# Patient Record
Sex: FEMALE | Race: WHITE
[De-identification: names, ages, dates, MRNs, and addresses within clinical notes are randomized per-mention and may not be internally consistent; named-entity substitution may affect disease eponyms.]

---

## 2017-09-12 ENCOUNTER — HOSPITAL ENCOUNTER (EMERGENCY)
Dept: HOSPITAL 17 - NEPD | Age: 27
Discharge: HOME | End: 2017-09-12
Payer: COMMERCIAL

## 2017-09-12 VITALS
RESPIRATION RATE: 16 BRPM | DIASTOLIC BLOOD PRESSURE: 99 MMHG | SYSTOLIC BLOOD PRESSURE: 155 MMHG | OXYGEN SATURATION: 99 % | TEMPERATURE: 98.1 F | HEART RATE: 84 BPM

## 2017-09-12 VITALS — BODY MASS INDEX: 22.84 KG/M2 | HEIGHT: 67 IN | WEIGHT: 145.51 LBS

## 2017-09-12 VITALS — SYSTOLIC BLOOD PRESSURE: 121 MMHG | DIASTOLIC BLOOD PRESSURE: 67 MMHG

## 2017-09-12 DIAGNOSIS — K30: ICD-10-CM

## 2017-09-12 DIAGNOSIS — J45.909: ICD-10-CM

## 2017-09-12 DIAGNOSIS — R53.81: ICD-10-CM

## 2017-09-12 DIAGNOSIS — Z34.91: ICD-10-CM

## 2017-09-12 DIAGNOSIS — J02.9: Primary | ICD-10-CM

## 2017-09-12 DIAGNOSIS — R11.0: ICD-10-CM

## 2017-09-12 DIAGNOSIS — Z3A.01: ICD-10-CM

## 2017-09-12 DIAGNOSIS — R05: ICD-10-CM

## 2017-09-12 DIAGNOSIS — R09.81: ICD-10-CM

## 2017-09-12 PROCEDURE — 99283 EMERGENCY DEPT VISIT LOW MDM: CPT

## 2017-09-12 NOTE — PD
HPI


Chief Complaint:  Cold / Flu Symptoms


Time Seen by Provider:  04:26


Travel History


International Travel<30 days:  No


Contact w/Intl Traveler<30days:  No


Traveled to known affect area:  No





History of Present Illness


HPI


27-year-old white female who states that she is approximately 6 weeks pregnant 

presents to emergency department with a three-day history of sore throat.  She 

states that in addition to her sore throat today she developed a low-grade 

temperature 100.  She admits to feeling general malaise, nausea and upset 

stomach, cough, nasal congestion.  She states that she assumes a lot of her 

symptoms are associated with early pregnancy.    She denies vomiting.  No 

abdominal pain.  No leakage of fluid or abnormal bleeding.  No urinary 

symptoms.  No shortness of breath or wheezing.





PFSH


Past Medical History


Asthma:  Yes


Cardiovascular Problems:  Yes (MURMUR, LOOP RECORDER IN PLACE)


Diminished Hearing:  No


Genitourinary:  Yes (FREQ UTI's)


Immunizations Current:  Yes


Pregnant?:  Pregnant


:  3


Para:  1


Miscarriage:  1





Past Surgical History


 Section:  Yes





Social History


Alcohol Use:  No


Tobacco Use:  No


Substance Use:  No





Allergies-Medications


(Allergen,Severity, Reaction):  


Coded Allergies:  


     metronidazole (Verified  Allergy, Severe, 17)


Reported Meds & Prescriptions





Reported Meds & Active Scripts


Active


Reported


Albuterol Neb (Albuterol Sulfate) 2.5 Mg/0.5 Ml Neb 2.5 Mg NEB Q4HR NEB PRN


     Note: The Albuterol Sulfate Inhalation Solution is concentrated and


     must be diluted. Read complete instructions carefully before using.


Proventil Hfa 6.7 GM Inh (Albuterol Sulfate) 90 Mcg/Act Aer 2 Puff INH Q6H PRN








Review of Systems


Except as stated in HPI:  all other systems reviewed are Neg





Physical Exam


Narrative


GENERAL:  Well-developed, well-nourished in no acute distress. Nontoxic 

appearing.


HEAD: Normocephalic, atraumatic.


EYES: Pupils equal round and reactive. Extraocular motions intact. No scleral 

icterus. No injection or drainage. 


ENT: TMs clear without erythema.  The external auditory canals clear.  Nose: 

clear .  Posterior pharynx is pink and moist.  No tonsillar edema or exudate.  

Uvula midline. Airway patent.


NECK: Trachea midline.Supple, nontender, moves head freely.  No central bony 

tenderness or spasm.


CARDIOVASCULAR: Regular rate and rhythm without murmurs, gallops, or rubs. 


RESPIRATORY: Clear to auscultation. Breath sounds equal bilaterally. No wheezes

, rales, or rhonchi.  


GASTROINTESTINAL: Abdomen soft, non-tender, nondistended. No hepato-splenomegaly

, or palpable masses. No guarding.


EXTREMITIES: No clubbing, cyanosis, or edema. No joint tenderness, effusion, or 

edema noted. 


BACK: Nontender without deformity or crepitance. No flank tenderness.





Data


Data


Last Documented VS





Vital Signs








  Date Time  Temp Pulse Resp B/P (MAP) Pulse Ox O2 Delivery O2 Flow Rate FiO2


 


17 04:24    121/67 (85)    


 


17 04:10 98.1 84 16  99 Room Air  








Orders





 Orders


Penicillin V Potassium (Veetids) (17 04:45)


Acetaminophen (Tylenol) (17 04:45)








MDM


Medical Decision Making


Medical Screen Exam Complete:  Yes


Emergency Medical Condition:  Yes


Medical Record Reviewed:  Yes


Differential Diagnosis


MDM: High


Differential diagnoses: Strep throat, viral pharyngitis, mono


Narrative Course


Patient is given Pen-Vee K 500 and Tylenol 500 mg by mouth.





This acute pharyngitis





Diagnosis





 Primary Impression:  


 Acute pharyngitis


 Qualified Codes:  J02.8 - Acute pharyngitis due to other specified organisms


Patient Instructions:  General Instructions





***Additional Instructions:  


Rest.


Force fluids.


Saltwater gargles.


Tylenol.


Pen-Vee K


Follow-up with a primary care doctor in one week.


Return to the ER if any problems.


***Med/Other Pt SpecificInfo:  Prescription(s) given


Scripts


Penicillin V Potassium (Penicillin V Potassium) 500 Mg Tab


500 MG PO Q12HR for Infection, #20 TAB 0 Refills


   Prov: Lani Campos MD         17


Disposition:  01 DISCHARGE HOME


Condition:  Stable











Maxwell Mcleod Sep 12, 2017 04:39

## 2018-03-02 ENCOUNTER — HOSPITAL ENCOUNTER (EMERGENCY)
Dept: HOSPITAL 17 - HOBED | Age: 28
Discharge: HOME | End: 2018-03-02
Payer: MEDICAID

## 2018-03-02 VITALS — HEART RATE: 72 BPM | SYSTOLIC BLOOD PRESSURE: 127 MMHG | DIASTOLIC BLOOD PRESSURE: 63 MMHG

## 2018-03-02 DIAGNOSIS — O47.03: ICD-10-CM

## 2018-03-02 DIAGNOSIS — Z88.8: ICD-10-CM

## 2018-03-02 DIAGNOSIS — R51: ICD-10-CM

## 2018-03-02 DIAGNOSIS — Z3A.30: ICD-10-CM

## 2018-03-02 DIAGNOSIS — O26.893: Primary | ICD-10-CM

## 2018-03-02 LAB
ALBUMIN SERPL-MCNC: 3.3 GM/DL (ref 3.4–5)
ALP SERPL-CCNC: 141 U/L (ref 45–117)
ALT SERPL-CCNC: 25 U/L (ref 10–53)
AST SERPL-CCNC: 20 U/L (ref 15–37)
BILIRUB SERPL-MCNC: 0.2 MG/DL (ref 0.2–1)
BUN SERPL-MCNC: 5 MG/DL (ref 7–18)
CALCIUM SERPL-MCNC: 9.3 MG/DL (ref 8.5–10.1)
CHLORIDE SERPL-SCNC: 103 MEQ/L (ref 98–107)
CREAT SERPL-MCNC: 0.46 MG/DL (ref 0.5–1)
ERYTHROCYTE [DISTWIDTH] IN BLOOD BY AUTOMATED COUNT: 13.7 % (ref 11.6–17.2)
GFR SERPLBLD BASED ON 1.73 SQ M-ARVRAT: 164 ML/MIN (ref 89–?)
GLUCOSE SERPL-MCNC: 68 MG/DL (ref 74–106)
HCO3 BLD-SCNC: 22.4 MEQ/L (ref 21–32)
HCT VFR BLD CALC: 35.4 % (ref 35–46)
HGB BLD-MCNC: 12.2 GM/DL (ref 11.6–15.3)
MCH RBC QN AUTO: 30.9 PG (ref 27–34)
MCHC RBC AUTO-ENTMCNC: 34.5 % (ref 32–36)
MCV RBC AUTO: 89.5 FL (ref 80–100)
PLATELET # BLD: 228 TH/MM3 (ref 150–450)
PMV BLD AUTO: 9.7 FL (ref 7–11)
PROT SERPL-MCNC: 7.8 GM/DL (ref 6.4–8.2)
RBC # BLD AUTO: 3.95 MIL/MM3 (ref 4–5.3)
SODIUM SERPL-SCNC: 137 MEQ/L (ref 136–145)
WBC # BLD AUTO: 13.4 TH/MM3 (ref 4–11)

## 2018-03-02 PROCEDURE — 96375 TX/PRO/DX INJ NEW DRUG ADDON: CPT

## 2018-03-02 PROCEDURE — 96374 THER/PROPH/DIAG INJ IV PUSH: CPT

## 2018-03-02 PROCEDURE — 84550 ASSAY OF BLOOD/URIC ACID: CPT

## 2018-03-02 PROCEDURE — 96372 THER/PROPH/DIAG INJ SC/IM: CPT

## 2018-03-02 PROCEDURE — 80053 COMPREHEN METABOLIC PANEL: CPT

## 2018-03-02 PROCEDURE — 99284 EMERGENCY DEPT VISIT MOD MDM: CPT

## 2018-03-02 PROCEDURE — 85027 COMPLETE CBC AUTOMATED: CPT

## 2018-03-02 NOTE — PD
HPI


Chief Complaint


Headache, epigastric pain, heartburn


Date Seen:  Mar 2, 2018


Time Seen:  14:00


Travel History


International Travel<30 Days:  No


Contact w/Intl Traveler<30Days:  No


Known Affected Area:  No





History of Present Illness


HPI


This patient is a 27-year-old white female  at 30 weeks gestation sees Dr. Romeo prenatal care previous  for severe preeclampsia at 29 weeks  

in the last pregnancy she presents claiming of a persistent frontal headache, 

some epigastric pain and heartburn relieved with over-the-counter Zantac.  

Denies right upper quadrant pain swelling, visual changes.  Baby is active 

fetal heart rate tracing is reactive and there are relatively regular 

contractions noted on the monitor if the patient is really not noticing except 

that she says her stomach gets tight.


Weeks Gestation:  30


Para:  1


:  3


Last Menstrual Period:  Mar 2, 2018


Miscarriage:  1





History


Obstetric History


Obstetric History


At 29 weeks patient last pregnancy is emergency  for severe 

preeclampsia





Past Surgical History


*** Narrative Surgical





Surgical History:  No Previous Surgery





Social History


Alcohol Use:  No


Tobacco Use:  No


Substance Abuse:  No





Allergies-Medications


(Allergen,Severity, Reaction):  


Coded Allergies:  


     metronidazole (Verified  Allergy, Severe, 17)


Home Meds


Active Scripts


Penicillin V Potassium (Penicillin V Potassium) 500 Mg Tab, 500 MG PO Q12HR for 

Infection, #20 TAB 0 Refills


   Prov:Lani Campos MD         17


Reported Medications


Albuterol Neb (Albuterol Neb) 2.5 Mg/0.5 Ml Neb, 2.5 MG NEB Q4HR NEB Y for 

SHORTNESS OF BREATH, EA


   Note: The Albuterol Sulfate Inhalation Solution is concentrated and


   must be diluted. Read complete instructions carefully before using.


   17


Albuterol 6.7 GM Inh (Proventil Hfa 6.7 GM Inh) 90 Mcg/Act Aer, 2 PUFF INH Q6H 

Y for SHORTNESS OF BREATH, #1 INHALER 0 Refills


   17





Review of Systems


General / Constitutional:  No: Fever, Weight Gain, Chills, Other


Eyes:  No: Diploplia, Blurred Vision, Visual changes, Pain, Photophobia


HENT:  Headaches, No: Vertigo, Lightheadedness


Cardiovascular:  No: Irregular Rhythm, Chest Pain or Discomfort, Palpitations, 

Tachycardia, Syncope, Varicosities, Edema, Cyanosis


Respiratory:  No: Cough, Short of Breath, Other


Gastrointestinal:  Indigestion, No: Nausea, Vomiting, Diarrhea


Genitourinary:  No: Decreased Urinary Output, Oliguria


Musculoskeletal:  No: Limited ROM, Weakness, Cramping, Edema, Pain


Skin:  No Rash, No Itching, No Dryness, No Lumps, No Change in Pigmentation, No 

Change in Nails, No Alopecia, No Lesions


Neurologic:  No: Weakness, Dizziness, Syncope, Focal Abnormalities, 

Coordination Problem, Headache, Slurred Speech, Seizures


Psychiatric:  No: Depression, Suicidal Ideations, Homicidal Ideation


Endocrine:  No: Heat Intolerance, Cold Intolerance, Polydipsia, Polyuria, Other





Physical Exam


Narrative


GENERAL: Well-nourished, well-developed patient.


SKIN: Warm and dry.


HEAD: Normocephalic and atraumatic.


EYES: No scleral icterus. No injection or drainage. 


ENT: No nasal drainage noted. Mucous membranes pink. Airway patent.


NECK: Supple, trachea midline. No JVD.


CARDIOVASCULAR: Regular rate and rhythm without murmurs, gallops, or rubs. 


RESPIRATORY: Breath sounds equal bilaterally. No accessory muscle use.


BREASTS: Bilateral exam showed no masses , no retractions, no nipple discharge.


ABDOMEN/GI: Abdomen soft, non-tender, bowel sounds present, no rebound, no 

guarding 


   Gravid to [-30] weeks size


   Fundal Height: [30-]


GENITOURINARY: 


   External Genitalia: intact and normal in appearance


   BUS glands: [-]


   Cervix: [post-]


   Dilatation: [0-]          


   Effacement: [0-]          


   Station: [-3]  


          


   Membranes: [intact ]


   Uterine Contractions: [-q 3min initially]


FHT's: 


   Category: [-1]   


   Baseline: [133-]   


   Reactive: [R-]   


   Variability: [-mod]  


   Decels: [none-]  


EXTREMITIES: No cyanosis or edema.


BACK: Nontender without obvious deformity. No CVA tenderness.


NEUROLOGICAL: Awake and alert. Motor and sensory grossly within normal limits. 

Five out of 5 muscle strength in all muscle groups. Normal speech.





Data


Data


Orders





 Orders


Cbc No Diff, Includes Plts (3/2/18 13:12)


Comprehensive Metabolic Panel (3/2/18 13:13)


Uric Acid (3/2/18 13:13)


Vital Signs (Adult) .ON ADMISSION (3/2/18 13:33)


^ Labor Status (3/2/18 13:33)


^ Non Stress Test (3/2/18 13:33)


^ Hydration (3/2/18 13:33)


Lactated Ringer's 1000 Ml Inj (Lr 1000 M (3/2/18 13:33)


Ondansetron Inj (Zofran Inj) (3/2/18 13:45)


Terbutaline Inj (Brethine Inj) (3/2/18 13:45)


Fentanyl Inj (Fentanyl Inj) (3/2/18 13:45)


Labs


Urine dip on OPD is negative for protein





MDM


Interpretation(s)


Patient is 27-year-old white female  at 30 weeks he sees Dr. Romeo 

prenatal care.  Patient's previous section for previous severe preeclampsia 29 

weeks.  She has been followed closely this pregnancy Dr. Romeo is worried she 

might try to get preeclamptic again because she had headaches and epigastric 

pain.  However blood pressures are normal 120/50 111/60 120/62.  She has no 

protein in urine, PIH lab is negative


However she was erlin every 3 minutes and felt that these needed to be 

addressing that she was really feeling much this filter may be balling up 

getting tight and she was erlin on the monitor so she received the basic 

triage tocolyse with IV fluids subcu TERB and IV fentanyl.


Plan


Patient received above-mentioned tocolytics.  The contractions decreased and 

are this point not an issue for her, fetal heart tones are still within normal 

limits.  Lab all within normal limits her blood pressures is totally normal 

plan to give the patient prescription for Protonix needed a prescription 

antacid as well as some p.o. Phenergan for nausea noted at home.  And she will 

follow up with Dr. Romeo


Diagnosis


Diagnosis:  


 Primary Impression:  


 Headache above the eye region


 Additional Impressions:  


 Threatened premature labor in third trimester


 Previous  section


Disposition:  01 DISCHARGE HOME


Condition:  Stable


Scripts


Promethazine (Phenergan) 25 Mg Tablet


25 MG PO Q6H Y for NAUSEA OR VOMITING for 10 Days, #60 TAB 1 Refill


   Prov: Jagjit Wilkins II, MD         3/2/18 


Pantoprazole (Protonix) 40 Mg Tab


40 MG PO DAILY for Reflux for 30 Days, #30 TAB 1 Refill


   Prov: Jagjit Wilkins II, MD         3/2/18











Jagjit Wilkins II, MD Mar 2, 2018 14:40

## 2018-03-04 ENCOUNTER — HOSPITAL ENCOUNTER (EMERGENCY)
Dept: HOSPITAL 17 - HOBED | Age: 28
Discharge: HOME | End: 2018-03-04
Payer: MEDICAID

## 2018-03-04 VITALS — WEIGHT: 160 LBS | BODY MASS INDEX: 25.11 KG/M2 | HEIGHT: 67 IN

## 2018-03-04 DIAGNOSIS — Z3A.30: ICD-10-CM

## 2018-03-04 DIAGNOSIS — O47.03: ICD-10-CM

## 2018-03-04 DIAGNOSIS — O26.893: Primary | ICD-10-CM

## 2018-03-04 DIAGNOSIS — R12: ICD-10-CM

## 2018-03-04 PROCEDURE — 99283 EMERGENCY DEPT VISIT LOW MDM: CPT

## 2018-03-04 PROCEDURE — 96372 THER/PROPH/DIAG INJ SC/IM: CPT

## 2018-03-06 ENCOUNTER — HOSPITAL ENCOUNTER (EMERGENCY)
Dept: HOSPITAL 17 - HOBED | Age: 28
Discharge: HOME | End: 2018-03-06
Payer: MEDICAID

## 2018-03-06 DIAGNOSIS — O16.3: Primary | ICD-10-CM

## 2018-03-06 DIAGNOSIS — Z3A.30: ICD-10-CM

## 2018-03-06 LAB
ALBUMIN SERPL-MCNC: 2.8 GM/DL (ref 3.4–5)
ALP SERPL-CCNC: 121 U/L (ref 45–117)
ALT SERPL-CCNC: 28 U/L (ref 10–53)
AST SERPL-CCNC: 26 U/L (ref 15–37)
BILIRUB SERPL-MCNC: 0.2 MG/DL (ref 0.2–1)
BUN SERPL-MCNC: 5 MG/DL (ref 7–18)
CALCIUM SERPL-MCNC: 8.7 MG/DL (ref 8.5–10.1)
CHLORIDE SERPL-SCNC: 107 MEQ/L (ref 98–107)
CREAT SERPL-MCNC: 0.44 MG/DL (ref 0.5–1)
ERYTHROCYTE [DISTWIDTH] IN BLOOD BY AUTOMATED COUNT: 13.5 % (ref 11.6–17.2)
GFR SERPLBLD BASED ON 1.73 SQ M-ARVRAT: 173 ML/MIN (ref 89–?)
GLUCOSE SERPL-MCNC: 75 MG/DL (ref 74–106)
HCO3 BLD-SCNC: 23.8 MEQ/L (ref 21–32)
HCT VFR BLD CALC: 31.9 % (ref 35–46)
HGB BLD-MCNC: 11.1 GM/DL (ref 11.6–15.3)
MCH RBC QN AUTO: 31.4 PG (ref 27–34)
MCHC RBC AUTO-ENTMCNC: 35 % (ref 32–36)
MCV RBC AUTO: 89.7 FL (ref 80–100)
PLATELET # BLD: 195 TH/MM3 (ref 150–450)
PMV BLD AUTO: 9.6 FL (ref 7–11)
PROT SERPL-MCNC: 6.9 GM/DL (ref 6.4–8.2)
RBC # BLD AUTO: 3.55 MIL/MM3 (ref 4–5.3)
SODIUM SERPL-SCNC: 138 MEQ/L (ref 136–145)
WBC # BLD AUTO: 10.8 TH/MM3 (ref 4–11)

## 2018-03-06 PROCEDURE — 85027 COMPLETE CBC AUTOMATED: CPT

## 2018-03-06 PROCEDURE — 84550 ASSAY OF BLOOD/URIC ACID: CPT

## 2018-03-06 PROCEDURE — 36415 COLL VENOUS BLD VENIPUNCTURE: CPT

## 2018-03-06 PROCEDURE — 80053 COMPREHEN METABOLIC PANEL: CPT

## 2018-03-06 PROCEDURE — 99283 EMERGENCY DEPT VISIT LOW MDM: CPT

## 2018-03-06 NOTE — PD
HPI


Chief Complaint


increased bp


Date Seen:  Mar 6, 2018


Time Seen:  12:38


Travel History


International Travel<30 Days:  No


Contact w/Intl Traveler<30Days:  No


Known Affected Area:  No





History of Present Illness


HPI


pt. is a  at 30 6/7 weeks presents as a referral from dr. rodriguez 2/2 

increased bp.  pt. was to have noted htn and brisk reflexes in office.  pt. 

previously seen for heart burn.  Pt. denies n/v, cp/sob, ha/visual changes, 

voiding probs.  +FM, no lof/vb.


Weeks Gestation:  30


Para:  1


:  3





History


Past Medical History


Medical History:  Denies Significant Hx





Past Surgical History


Surgical History:  No Previous Surgery





Family History


Family History:  Negative





Social History


Alcohol Use:  No


Tobacco Use:  No


Substance Abuse:  No





Allergies-Medications


(Allergen,Severity, Reaction):  


Coded Allergies:  


     metronidazole (Verified  Allergy, Severe, 3/6/18)


Home Meds


Active Scripts


Promethazine (Phenergan) 25 Mg Tablet, 25 MG PO Q6H Y for NAUSEA OR VOMITING 

for 10 Days, #60 TAB 1 Refill


   Prov:Jagjit Wilkins II, MD         3/2/18


Pantoprazole (Protonix) 40 Mg Tab, 40 MG PO DAILY for Reflux for 30 Days, #30 

TAB 1 Refill


   Prov:Jajgit Wilkins II, MD         3/2/18


Reported Medications


Albuterol Neb (Albuterol Neb) 2.5 Mg/0.5 Ml Neb, 2.5 MG NEB Q4HR NEB Y for 

SHORTNESS OF BREATH, EA


   Note: The Albuterol Sulfate Inhalation Solution is concentrated and


   must be diluted. Read complete instructions carefully before using.


   17


Albuterol 6.7 GM Inh (Proventil Hfa 6.7 GM Inh) 90 Mcg/Act Aer, 2 PUFF INH Q6H 

Y for SHORTNESS OF BREATH, #1 INHALER 0 Refills


   17


Discontinued Scripts


Penicillin V Potassium (Penicillin V Potassium) 500 Mg Tab, 500 MG PO Q12HR for 

Infection, #20 TAB 0 Refills


   Prov:Lani Campos MD         17





Review of Systems


Except as stated in HPI:  all other systems reviewed are Neg





Physical Exam


Narrative


GENERAL: Well-nourished, well-developed patient.


SKIN: Warm and dry.


HEAD: Normocephalic and atraumatic.


EYES: No scleral icterus. No injection or drainage. 


ENT: No nasal drainage noted. Mucous membranes pink. Airway patent.


NECK: Supple, trachea midline. No JVD.


CARDIOVASCULAR: Regular rate and rhythm without murmurs, gallops, or rubs. 


RESPIRATORY: Breath sounds equal bilaterally. No accessory muscle use.


ABDOMEN/GI: Abdomen soft, non-tender, bowel sounds present, no rebound, no 

guarding 


   Gravid 


FHT's: 


   Category: 1   


   Reactive: +   


   Variability: mod


   Decels: none 


EXTREMITIES: No cyanosis or edema.


BACK: Nontender without obvious deformity. No CVA tenderness.


NEUROLOGICAL: Awake and alert. Motor and sensory grossly within normal limits. 

Five out of 5 muscle strength in all muscle groups. Normal speech.





Data


Data


Vital Signs Reviewed:  Yes


Orders





 Orders


Cbc No Diff, Includes Plts (3/6/18 11:44)


Comprehensive Metabolic Panel (3/6/18 11:44)


Uric Acid (3/6/18 11:44)


Urinalysis - C+S If Indicated (3/6/18 11:44)





[facility]


Medical Record Reviewed:  Yes


Plan


pt. have urine dip - for protein and nl bp.  condition d/w pt.  all ? answered.

  pt. to be d/c to home.  given precautions for return. f/u as sched.


Diagnosis


Diagnosis:  


 Primary Impression:  


 Hypertension affecting pregnancy


 Additional Impression:  


 30 weeks gestation of pregnancy


Disposition:   DISCHARGE HOME











Marco Delarosa Jr., MD Mar 6, 2018 12:41

## 2018-03-09 ENCOUNTER — HOSPITAL ENCOUNTER (EMERGENCY)
Dept: HOSPITAL 17 - HOBED | Age: 28
Discharge: HOME | End: 2018-03-09
Payer: MEDICAID

## 2018-03-09 DIAGNOSIS — O47.03: Primary | ICD-10-CM

## 2018-03-09 DIAGNOSIS — Z3A.31: ICD-10-CM

## 2018-03-09 PROCEDURE — 82731 ASSAY OF FETAL FIBRONECTIN: CPT

## 2018-03-09 PROCEDURE — 99283 EMERGENCY DEPT VISIT LOW MDM: CPT

## 2018-03-09 NOTE — PD
HPI


Chief Complaint


Possible contractions


Date Seen:  Mar 9, 2018


Time Seen:  13:15


Travel History


International Travel<30 Days:  No


Contact w/Intl Traveler<30Days:  No


Known Affected Area:  No





History of Present Illness


HPI


27-year-old  3 para 1 at 31-2/7 weeks gestation who comes today for 

possible contractions.  She denies leakage of fluid, bleeding or decreased 

fetal movement.





History


Past Medical History


Medical History:  Denies Significant Hx





Obstetric History


Obstetric History


Current pregnancy under the care of Dr. Romeo


She is having GERD





Her 5-year-old son was delivered by  at 29 weeks for severe 

preeclampsia


Recent preeclamptic evaluation this pregnancy was negative.





Past Surgical History


*** Narrative Surgical








Family History


Family History:  Negative





Social History


Alcohol Use:  No


Tobacco Use:  No


Substance Abuse:  No





Allergies-Medications


(Allergen,Severity, Reaction):  


Coded Allergies:  


     metronidazole (Verified  Allergy, Severe, 3/6/18)


Home Meds


Active Scripts


Promethazine (Phenergan) 25 Mg Tablet, 25 MG PO Q6H Y for NAUSEA OR VOMITING 

for 10 Days, #60 TAB 1 Refill


   Prov:Jagjit Wilkins II, MD         3/2/18


Pantoprazole (Protonix) 40 Mg Tab, 40 MG PO DAILY for Reflux for 30 Days, #30 

TAB 1 Refill


   Prov:Jagjit Wilkins II, MD         3/2/18


Reported Medications


Albuterol Neb (Albuterol Neb) 2.5 Mg/0.5 Ml Neb, 2.5 MG NEB Q4HR NEB Y for 

SHORTNESS OF BREATH, EA


   Note: The Albuterol Sulfate Inhalation Solution is concentrated and


   must be diluted. Read complete instructions carefully before using.


   17


Albuterol 6.7 GM Inh (Proventil Hfa 6.7 GM Inh) 90 Mcg/Act Aer, 2 PUFF INH Q6H 

Y for SHORTNESS OF BREATH, #1 INHALER 0 Refills


   17


Discontinued Scripts


Penicillin V Potassium (Penicillin V Potassium) 500 Mg Tab, 500 MG PO Q12HR for 

Infection, #20 TAB 0 Refills


   Prov:Lani Campos MD         17





Review of Systems


Except as stated in HPI:  all other systems reviewed are Neg





Physical Exam


Narrative


GENERAL: Well-nourished, well-developed patient.


SKIN: Warm and dry.


HEAD: Normocephalic and atraumatic.


EYES: No scleral icterus. No injection or drainage. 


ENT: No nasal drainage noted. Mucous membranes pink. Airway patent.


NECK: Supple, trachea midline. No JVD.


CARDIOVASCULAR: Regular rate and rhythm without murmurs, gallops, or rubs. 


RESPIRATORY: Breath sounds equal bilaterally. No accessory muscle use.


ABDOMEN/GI: Abdomen soft, non-tender, bowel sounds present, no rebound, no 

guarding 


   Gravid to [-] weeks size


   Fundal Height: [32-]


GENITOURINARY: 


   External Genitalia: intact and normal in appearance


   BUS glands: [-Negative]


   Cervix: [-]


   Dilatation: [-]          


   Effacement: [-]          


   Station: [-]  


   Presentation: [-]        


   Membranes: [ ruptured]


   Uterine Contractions: [-]


FHT's: 


   Category: [-]   


   Baseline: [-]   


   Reactive: [-]   


   Variability: [-]  


   Decels: [-]  


EXTREMITIES: No cyanosis or edema.


BACK: Nontender without obvious deformity. No CVA tenderness.


NEUROLOGICAL: Awake and alert. Motor and sensory grossly within normal limits. 

Five out of 5 muscle strength in all muscle groups. Normal speech.





Data


Data


Orders





 Orders


Fetal Fibronectin (3/9/18 13:12)





MDM


Medical Record Reviewed:  Yes


Narrative Course / MDM


Assessment: 31+ weeks gestation with uterine irritability





Plan: Negative fetal fibronectin and diminished perception of contractions by 

the patient since arrival.  She'll be discharged home with labor precautions.


Diagnosis


Diagnosis:  


 Primary Impression:  


 31 weeks gestation of pregnancy


 Additional Impression:  


 Irregular uterine contractions


Disposition:  01 DISCHARGE HOME


Condition:  Good











González Mcmanus MD Mar 9, 2018 13:19

## 2018-03-26 ENCOUNTER — HOSPITAL ENCOUNTER (OUTPATIENT)
Dept: HOSPITAL 17 - HPND | Age: 28
End: 2018-03-26
Attending: OBSTETRICS & GYNECOLOGY
Payer: MEDICAID

## 2018-03-26 DIAGNOSIS — O09.293: ICD-10-CM

## 2018-03-26 DIAGNOSIS — O09.213: Primary | ICD-10-CM

## 2018-03-26 PROCEDURE — 76816 OB US FOLLOW-UP PER FETUS: CPT

## 2018-03-27 ENCOUNTER — HOSPITAL ENCOUNTER (EMERGENCY)
Dept: HOSPITAL 17 - HOBED | Age: 28
Discharge: HOME | End: 2018-03-27
Payer: MEDICAID

## 2018-03-27 DIAGNOSIS — R11.0: ICD-10-CM

## 2018-03-27 DIAGNOSIS — Z88.8: ICD-10-CM

## 2018-03-27 DIAGNOSIS — R19.7: ICD-10-CM

## 2018-03-27 DIAGNOSIS — Z3A.33: ICD-10-CM

## 2018-03-27 DIAGNOSIS — O26.893: Primary | ICD-10-CM

## 2018-03-27 LAB
ALBUMIN SERPL-MCNC: 2.8 GM/DL (ref 3.4–5)
ALP SERPL-CCNC: 155 U/L (ref 45–117)
ALT SERPL-CCNC: 21 U/L (ref 10–53)
AST SERPL-CCNC: 27 U/L (ref 15–37)
BASOPHILS # BLD AUTO: 0 TH/MM3 (ref 0–0.2)
BASOPHILS NFR BLD: 0.4 % (ref 0–2)
BILIRUB SERPL-MCNC: 0.5 MG/DL (ref 0.2–1)
BUN SERPL-MCNC: 6 MG/DL (ref 7–18)
CALCIUM SERPL-MCNC: 8.7 MG/DL (ref 8.5–10.1)
CHLORIDE SERPL-SCNC: 106 MEQ/L (ref 98–107)
COLOR UR: YELLOW
CREAT SERPL-MCNC: 0.51 MG/DL (ref 0.5–1)
EOSINOPHIL # BLD: 0 TH/MM3 (ref 0–0.4)
EOSINOPHIL NFR BLD: 0.1 % (ref 0–4)
ERYTHROCYTE [DISTWIDTH] IN BLOOD BY AUTOMATED COUNT: 13.5 % (ref 11.6–17.2)
GFR SERPLBLD BASED ON 1.73 SQ M-ARVRAT: 146 ML/MIN (ref 89–?)
GLUCOSE SERPL-MCNC: 77 MG/DL (ref 74–106)
GLUCOSE UR STRIP-MCNC: (no result) MG/DL
HCO3 BLD-SCNC: 19.9 MEQ/L (ref 21–32)
HCT VFR BLD CALC: 34.3 % (ref 35–46)
HGB BLD-MCNC: 11.8 GM/DL (ref 11.6–15.3)
HGB UR QL STRIP: (no result)
KETONES UR STRIP-MCNC: (no result) MG/DL
LYMPHOCYTES # BLD AUTO: 0.8 TH/MM3 (ref 1–4.8)
LYMPHOCYTES NFR BLD AUTO: 7.4 % (ref 9–44)
MCH RBC QN AUTO: 30.4 PG (ref 27–34)
MCHC RBC AUTO-ENTMCNC: 34.5 % (ref 32–36)
MCV RBC AUTO: 88.3 FL (ref 80–100)
MONOCYTE #: 0.7 TH/MM3 (ref 0–0.9)
MONOCYTES NFR BLD: 6.1 % (ref 0–8)
NEUTROPHILS # BLD AUTO: 9.8 TH/MM3 (ref 1.8–7.7)
NEUTROPHILS NFR BLD AUTO: 86 % (ref 16–70)
NITRITE UR QL STRIP: (no result)
PLATELET # BLD: 191 TH/MM3 (ref 150–450)
PMV BLD AUTO: 9.9 FL (ref 7–11)
PROT SERPL-MCNC: 7.3 GM/DL (ref 6.4–8.2)
RBC # BLD AUTO: 3.89 MIL/MM3 (ref 4–5.3)
SODIUM SERPL-SCNC: 136 MEQ/L (ref 136–145)
SP GR UR STRIP: 1.01 (ref 1–1.03)
SQUAMOUS #/AREA URNS HPF: 4 /HPF (ref 0–5)
URINE LEUKOCYTE ESTERASE: (no result)
WBC # BLD AUTO: 11.4 TH/MM3 (ref 4–11)

## 2018-03-27 PROCEDURE — 59025 FETAL NON-STRESS TEST: CPT

## 2018-03-27 PROCEDURE — 96361 HYDRATE IV INFUSION ADD-ON: CPT

## 2018-03-27 PROCEDURE — 80053 COMPREHEN METABOLIC PANEL: CPT

## 2018-03-27 PROCEDURE — 99284 EMERGENCY DEPT VISIT MOD MDM: CPT

## 2018-03-27 PROCEDURE — 81001 URINALYSIS AUTO W/SCOPE: CPT

## 2018-03-27 PROCEDURE — 96360 HYDRATION IV INFUSION INIT: CPT

## 2018-03-27 PROCEDURE — 85025 COMPLETE CBC W/AUTO DIFF WBC: CPT

## 2018-03-27 NOTE — PD
HPI


Chief Complaint


diarrhea.


Date Seen:  Mar 27, 2018


Time Seen:  11:50


Travel History


International Travel<30 Days:  No


Contact w/Intl Traveler<30Days:  No


Known Affected Area:  No





History of Present Illness


HPI


pt is a 28 y/o  @ 33 5/7 weeks present w/ c/o nausea and diarrhea.  pt. 

states for the last 2 days has had constant loose stools.  pt. states not sure 

what caused loose stools. has not been able to keep anything down.  +FM, 

occasional ctx, no vb.  denies fever/chills.


Weeks Gestation:  33


Para:  1


:  3





History


Past Medical History


Medical History:  Denies Significant Hx





Past Surgical History


Surgical History:  No Previous Surgery





Family History


Family History:  Negative





Social History


Alcohol Use:  No


Tobacco Use:  No


Substance Abuse:  No





Allergies-Medications


(Allergen,Severity, Reaction):  


Coded Allergies:  


     metronidazole (Verified  Allergy, Severe, 3/6/18)


Home Meds


Active Scripts


Promethazine (Phenergan) 25 Mg Tablet, 25 MG PO Q6H Y for NAUSEA OR VOMITING 

for 10 Days, #60 TAB 1 Refill


   Prov:Jagjit Wilkins II, MD         3/2/18


Pantoprazole (Protonix) 40 Mg Tab, 40 MG PO DAILY for Reflux for 30 Days, #30 

TAB 1 Refill


   Prov:Jagjit Wilkins II, MD         3/2/18


Reported Medications


Albuterol Neb (Albuterol Neb) 2.5 Mg/0.5 Ml Neb, 2.5 MG NEB Q4HR NEB Y for 

SHORTNESS OF BREATH, EA


   Note: The Albuterol Sulfate Inhalation Solution is concentrated and


   must be diluted. Read complete instructions carefully before using.


   17


Albuterol 6.7 GM Inh (Proventil Hfa 6.7 GM Inh) 90 Mcg/Act Aer, 2 PUFF INH Q6H 

Y for SHORTNESS OF BREATH, #1 INHALER 0 Refills


   17





Review of Systems


Except as stated in HPI:  all other systems reviewed are Neg





Physical Exam


Narrative


GENERAL: Well-nourished, well-developed patient.


SKIN: Warm and dry.


HEAD: Normocephalic and atraumatic.


EYES: No scleral icterus. No injection or drainage. 


ENT: No nasal drainage noted. Mucous membranes pink. Airway patent.


NECK: Supple, trachea midline. No JVD.


CARDIOVASCULAR: Regular rate and rhythm without murmurs, gallops, or rubs. 


RESPIRATORY: Breath sounds equal bilaterally. No accessory muscle use.


BREASTS: Bilateral exam showed no masses , no retractions, no nipple discharge.


ABDOMEN/GI: Abdomen soft, non-tender, bowel sounds present, no rebound, no 

guarding 


   Gravid 


   Uterine Contractions: irreg


FHT's: 


   Category: 1   


   Reactive: +  


   Variability: mod


EXTREMITIES: No cyanosis or edema.


BACK: Nontender without obvious deformity. No CVA tenderness.


NEUROLOGICAL: Awake and alert. Motor and sensory grossly within normal limits. 

Five out of 5 muscle strength in all muscle groups. Normal speech.





Data


Data


Orders





 Orders


Complete Blood Count With Diff (3/27/18 09:06)


Comprehensive Metabolic Panel (3/27/18 09:06)


Urinalysis - C+S If Indicated (3/27/18 09:06)


Dextrose 5%-Lactated Ring Inj (D5-Lr Inj (3/27/18 09:15)


Ondansetron  Odt (Zofran  Odt) (3/27/18 11:45)


Labs





Laboratory Tests








Test


  3/27/18


08:35


 


White Blood Count 11.4 


 


Red Blood Count 3.89 


 


Hemoglobin 11.8 


 


Hematocrit 34.3 


 


Mean Corpuscular Volume 88.3 


 


Mean Corpuscular Hemoglobin 30.4 


 


Mean Corpuscular Hemoglobin


Concent 34.5 


 


 


Red Cell Distribution Width 13.5 


 


Platelet Count 191 


 


Mean Platelet Volume 9.9 


 


Neutrophils (%) (Auto) 86.0 


 


Lymphocytes (%) (Auto) 7.4 


 


Monocytes (%) (Auto) 6.1 


 


Eosinophils (%) (Auto) 0.1 


 


Basophils (%) (Auto) 0.4 


 


Neutrophils # (Auto) 9.8 


 


Lymphocytes # (Auto) 0.8 


 


Monocytes # (Auto) 0.7 


 


Eosinophils # (Auto) 0.0 


 


Basophils # (Auto) 0.0 


 


CBC Comment DIFF FINAL 


 


Differential Comment  


 


Urine Color YELLOW 


 


Urine Turbidity CLEAR 


 


Urine pH 6.5 


 


Urine Specific Gravity 1.011 


 


Urine Protein NEG 


 


Urine Glucose (UA) NEG 


 


Urine Ketones TRACE 


 


Urine Occult Blood NEG 


 


Urine Nitrite NEG 


 


Urine Bilirubin NEG 


 


Urine Urobilinogen LESS THAN 2.0 


 


Urine Leukocyte Esterase MOD 


 


Urine RBC 1 


 


Urine WBC 2 


 


Urine Squamous Epithelial


Cells 4 


 


 


Microscopic Urinalysis Comment


  CULT NOT


INDICATED


 


Blood Urea Nitrogen 6 


 


Creatinine 0.51 


 


Random Glucose 77 


 


Total Protein 7.3 


 


Albumin 2.8 


 


Calcium Level 8.7 


 


Alkaline Phosphatase 155 


 


Aspartate Amino Transf


(AST/SGOT) 27 


 


 


Alanine Aminotransferase


(ALT/SGPT) 21 


 


 


Total Bilirubin 0.5 


 


Sodium Level 136 


 


Potassium Level 3.7 


 


Chloride Level 106 


 


Carbon Dioxide Level 19.9 


 


Anion Gap 10 


 


Estimat Glomerular Filtration


Rate 146 


 











MDM


Medical Record Reviewed:  Yes


Plan


pt. receive 1 liter of fluid.  pt. have sl zofran.  pt. to be d/c home.  pt. 

given precautions for return.  pt. to f/u as sched.


Diagnosis


Diagnosis:  


 Primary Impression:  


 Nausea


 Additional Impressions:  


 Diarrhea


 33 weeks gestation of pregnancy


Disposition:   DISCHARGE HOME











Marco Delarosa Jr., MD Mar 27, 2018 12:02

## 2018-05-07 ENCOUNTER — HOSPITAL ENCOUNTER (INPATIENT)
Dept: HOSPITAL 17 - H2EB | Age: 28
LOS: 3 days | Discharge: HOME | End: 2018-05-10
Attending: OBSTETRICS & GYNECOLOGY | Admitting: OBSTETRICS & GYNECOLOGY
Payer: MEDICAID

## 2018-05-07 VITALS
TEMPERATURE: 97.8 F | HEART RATE: 51 BPM | SYSTOLIC BLOOD PRESSURE: 114 MMHG | RESPIRATION RATE: 17 BRPM | DIASTOLIC BLOOD PRESSURE: 62 MMHG

## 2018-05-07 VITALS
SYSTOLIC BLOOD PRESSURE: 121 MMHG | HEART RATE: 56 BPM | RESPIRATION RATE: 17 BRPM | TEMPERATURE: 97.5 F | DIASTOLIC BLOOD PRESSURE: 78 MMHG

## 2018-05-07 VITALS — HEIGHT: 67 IN | BODY MASS INDEX: 27.62 KG/M2 | WEIGHT: 176 LBS

## 2018-05-07 VITALS
OXYGEN SATURATION: 97 % | SYSTOLIC BLOOD PRESSURE: 119 MMHG | HEART RATE: 51 BPM | DIASTOLIC BLOOD PRESSURE: 69 MMHG | RESPIRATION RATE: 20 BRPM

## 2018-05-07 VITALS — RESPIRATION RATE: 18 BRPM | TEMPERATURE: 98.2 F

## 2018-05-07 VITALS — TEMPERATURE: 97.5 F

## 2018-05-07 DIAGNOSIS — Z30.2: ICD-10-CM

## 2018-05-07 DIAGNOSIS — Z88.1: ICD-10-CM

## 2018-05-07 DIAGNOSIS — Z79.899: ICD-10-CM

## 2018-05-07 DIAGNOSIS — J45.909: ICD-10-CM

## 2018-05-07 DIAGNOSIS — Z79.82: ICD-10-CM

## 2018-05-07 DIAGNOSIS — D64.9: ICD-10-CM

## 2018-05-07 DIAGNOSIS — K21.9: ICD-10-CM

## 2018-05-07 DIAGNOSIS — O34.219: Primary | ICD-10-CM

## 2018-05-07 DIAGNOSIS — Z3A.39: ICD-10-CM

## 2018-05-07 LAB
BACTERIA #/AREA URNS HPF: (no result) /HPF
BASOPHILS # BLD AUTO: 0 TH/MM3 (ref 0–0.2)
BASOPHILS NFR BLD: 0.4 % (ref 0–2)
COLOR UR: (no result)
EOSINOPHIL # BLD: 0 TH/MM3 (ref 0–0.4)
EOSINOPHIL NFR BLD: 0.5 % (ref 0–4)
ERYTHROCYTE [DISTWIDTH] IN BLOOD BY AUTOMATED COUNT: 14.2 % (ref 11.6–17.2)
GLUCOSE UR STRIP-MCNC: (no result) MG/DL
HCT VFR BLD CALC: 33.9 % (ref 35–46)
HGB BLD-MCNC: 11.6 GM/DL (ref 11.6–15.3)
HGB UR QL STRIP: (no result)
KETONES UR STRIP-MCNC: (no result) MG/DL
LYMPHOCYTES # BLD AUTO: 2.2 TH/MM3 (ref 1–4.8)
LYMPHOCYTES NFR BLD AUTO: 22.9 % (ref 9–44)
MCH RBC QN AUTO: 30.1 PG (ref 27–34)
MCHC RBC AUTO-ENTMCNC: 34.1 % (ref 32–36)
MCV RBC AUTO: 88.2 FL (ref 80–100)
MONOCYTE #: 0.7 TH/MM3 (ref 0–0.9)
MONOCYTES NFR BLD: 7.7 % (ref 0–8)
MUCOUS THREADS #/AREA URNS LPF: (no result) /LPF
NEUTROPHILS # BLD AUTO: 6.6 TH/MM3 (ref 1.8–7.7)
NEUTROPHILS NFR BLD AUTO: 68.5 % (ref 16–70)
NITRITE UR QL STRIP: (no result)
PLATELET # BLD: 199 TH/MM3 (ref 150–450)
PMV BLD AUTO: 10.1 FL (ref 7–11)
RBC # BLD AUTO: 3.85 MIL/MM3 (ref 4–5.3)
SP GR UR STRIP: 1.01 (ref 1–1.03)
SQUAMOUS #/AREA URNS HPF: 10 /HPF (ref 0–5)
URINE LEUKOCYTE ESTERASE: (no result)
WBC # BLD AUTO: 9.7 TH/MM3 (ref 4–11)

## 2018-05-07 PROCEDURE — 59025 FETAL NON-STRESS TEST: CPT

## 2018-05-07 PROCEDURE — 80307 DRUG TEST PRSMV CHEM ANLYZR: CPT

## 2018-05-07 PROCEDURE — 88302 TISSUE EXAM BY PATHOLOGIST: CPT

## 2018-05-07 PROCEDURE — 81001 URINALYSIS AUTO W/SCOPE: CPT

## 2018-05-07 PROCEDURE — 0UB70ZZ EXCISION OF BILATERAL FALLOPIAN TUBES, OPEN APPROACH: ICD-10-PCS | Performed by: OBSTETRICS & GYNECOLOGY

## 2018-05-07 PROCEDURE — 86900 BLOOD TYPING SEROLOGIC ABO: CPT

## 2018-05-07 PROCEDURE — 90715 TDAP VACCINE 7 YRS/> IM: CPT

## 2018-05-07 PROCEDURE — 86901 BLOOD TYPING SEROLOGIC RH(D): CPT

## 2018-05-07 PROCEDURE — 87086 URINE CULTURE/COLONY COUNT: CPT

## 2018-05-07 PROCEDURE — 86850 RBC ANTIBODY SCREEN: CPT

## 2018-05-07 PROCEDURE — 85025 COMPLETE CBC W/AUTO DIFF WBC: CPT

## 2018-05-07 RX ADMIN — IBUPROFEN PRN MG: 600 TABLET, FILM COATED ORAL at 23:21

## 2018-05-07 RX ADMIN — OXYCODONE HYDROCHLORIDE AND ACETAMINOPHEN PRN TAB: 5; 325 TABLET ORAL at 23:22

## 2018-05-07 RX ADMIN — IBUPROFEN PRN MG: 600 TABLET, FILM COATED ORAL at 15:29

## 2018-05-07 RX ADMIN — OXYCODONE HYDROCHLORIDE AND ACETAMINOPHEN PRN TAB: 5; 325 TABLET ORAL at 18:15

## 2018-05-07 NOTE — HHI.HP
HPI


Chief Complaint


Repeat  and BTL


Date Seen:  May 7, 2018


Travel History


International Travel<30 Days:  No


Contact w/Intl Traveler<30Days:  No





History of Present Illness


HPI


Patient is a 27 year old  at 39-4/7 weeks gestation who presents today for 

repeat  and bilateral tubal ligation.  She denies any vaginal bleeding 

or discharge. No gush or leaking of fluid. Positive fetal movement. Occasional 

contractions. No complications with this pregnancy.





History


Past Medical History


*** Narrative Medical


GERD


Asthma





Obstetric History


Obstetric History


 


 at 29-4/7 weeks for eclampsia 


Miscarriage 





Past Surgical History


*** Narrative Surgical








Family History


Family History:  Negative





Social History


Alcohol Use:  No


Tobacco Use:  No


Substance Abuse:  No





Allergies-Medications


(Allergen,Severity, Reaction):  


Coded Allergies:  


     metronidazole (Verified  Allergy, Severe, 18)


Home Meds


Active Scripts


Pantoprazole (Protonix) 40 Mg Tab, 40 MG PO DAILY for Reflux for 30 Days, #30 

TAB 1 Refill


   Prov:Jagjit Wilkins II, MD         3/2/18


Reported Medications


Prenatal Vit-Iron Carbonyl (Prenatal Plus Iron 29-1 mg) 29 Mg Iron-1 Mg Tab, 1 

TAB PO DAILY for Nutritional Supplement, #30 TAB 0 Refills


   18


Aspirin (Aspirin Children's) 81 Mg Chew, 81 MG CHEW DAILY, TAB 0 Refills


   18


Albuterol Neb (Albuterol Neb) 2.5 Mg/0.5 Ml Neb, 2.5 MG NEB Q4HR NEB Y for 

SHORTNESS OF BREATH, EA


   Note: The Albuterol Sulfate Inhalation Solution is concentrated and


   must be diluted. Read complete instructions carefully before using.


   17


Albuterol 6.7 GM Inh (Proventil Hfa 6.7 GM Inh) 90 Mcg/Act Aer, 2 PUFF INH Q6H 

Y for SHORTNESS OF BREATH, #1 INHALER 0 Refills


   17


Discontinued Scripts


Promethazine (Phenergan) 25 Mg Tablet, 25 MG PO Q6H Y for NAUSEA OR VOMITING 

for 10 Days, #60 TAB 1 Refill


   Prov:Jagjit Wilkins II, MD         3/2/18





Review of Systems


Except as stated in HPI:  all other systems reviewed are Neg


General / Constitutional:  No: Fever, Chills


Eyes:  No: Blurred Vision, Visual changes


Cardiovascular:  No: Palpitations


Respiratory:  No: Short of Breath


Gastrointestinal:  No: Nausea, Vomiting, Abdominal Pain


Genitourinary:  No: Dysuria, Pelvic Pain, Discharge, Vaginal Bleeding


Musculoskeletal:  No: Edema


Psychiatric:  No: Substance Abuse





Physical Exam


Narrative


GENERAL: Well-nourished, well-developed patient.


SKIN: Warm and dry.


HEAD: Normocephalic and atraumatic.


EYES: No scleral icterus. No injection or drainage. 


ENT: No nasal drainage noted. Mucous membranes pink. Airway patent.


NECK: Supple, trachea midline. No JVD.


CARDIOVASCULAR: Regular rate and rhythm without murmurs, gallops, or rubs. 


RESPIRATORY: Breath sounds equal bilaterally. No accessory muscle use.


ABDOMEN/GI: Abdomen soft, non-tender, bowel sounds present, no rebound, no 

guarding 


   Gravid to 39 weeks size


GENITOURINARY: 


   Membranes: intact


   Uterine Contractions: occasional


FHT's: 


   Category: I  


   Baseline: 130   


   Reactive: +   


   Variability: moderate  


   Decels: none  


EXTREMITIES: No cyanosis or edema.


BACK: Nontender without obvious deformity.


NEUROLOGICAL: Awake and alert. Motor and sensory grossly within normal limits. 

Normal speech.





Caprini VTE Risk Assessment


Caprini VTE Risk Assessment:  No/Low Risk (score <= 1)


Caprini Risk Assessment Model











 Point Value = 1          Point Value = 2  Point Value = 3  Point Value = 5


 


Age 41-60


Minor surgery


BMI > 25 kg/m2


Swollen legs


Varicose veins


Pregnancy or postpartum


History of unexplained or recurrent


   spontaneous 


Oral contraceptives or hormone


   replacement


Sepsis (< 1 month)


Serious lung disease, including


   pneumonia (< 1 month)


Abnormal pulmonary function


Acute myocardial infarction


Congestive heart failure (< 1 month)


History of inflammatory bowel disease


Medical patient at bed rest Age 61-74


Arthroscopic surgery


Major open surgery (> 45 min)


Laparoscopic surgery (> 45 min)


Malignancy


Confined to bed (> 72 hours)


Immobilizing plaster cast


Central venous access Age >= 75


History of VTE


Family history of VTE


Factor V Leiden


Prothrombin 72030G


Lupus anticoagulant


Anticardiolipin antibodies


Elevated serum homocysteine


Heparin-induced thrombocytopenia


Other congenital or acquired


   thrombophilia Stroke (< 1 month)


Elective arthroplasty


Hip, pelvis, or leg fracture


Acute spinal cord injury (< 1 month)








Prophylaxis Regimen











   Total Risk


Factor Score Risk Level Prophylaxis Regimen


 


0-1      Low Early ambulation


 


2 Moderate Order ONE of the following:


*Sequential Compression Device (SCD)


*Heparin 5000 units SQ BID


 


3-4 Higher Order ONE of the following medications:


*Heparin 5000 units SQ TID


*Enoxaparin/Lovenox 40 mg SQ daily (WT < 150 kg, CrCl > 30 mL/min)


*Enoxaparin/Lovenox 30 mg SQ daily (WT < 150 kg, CrCl > 10-29 mL/min)


*Enoxaparin/Lovenox 30 mg SQ BID (WT < 150 kg, CrCl > 30 mL/min)


AND/OR


*Sequential Compression Device (SCD)


 


5 or more Highest Order ONE of the following medications:


*Heparin 5000 units SQ TID (Preferred with Epidurals)


*Enoxaparin/Lovenox 40 mg SQ daily (WT < 150 kg, CrCl > 30 mL/min)


*Enoxaparin/Lovenox 30 mg SQ daily (WT < 150 kg, CrCl > 10-29 mL/min)


*Enoxaparin/Lovenox 30 mg SQ BID (WT < 150 kg, CrCl > 30 mL/min)


AND


*Sequential Compression Device (SCD)











Data


Data


Vital Signs Reviewed:  Yes


Orders





 Orders


Admit To Inpatient (18 )


Code Status (18 09:56)


Vital Signs (Adult) .ON ADMISSION (18 09:56)


Activity Oob Ad Katheryn (18 09:56)


Fetal Heart (18 09:56)


Urinary Catheter Management FRIDA.Q8H (18 09:56)


^ Preps (18 09:56)


Scd / Abdon / Foot Pump FRIDA.QSHIFT (18 09:56)


^ Ultrasound For Fetal Locatio (18 09:56)


Diet Npo (18 Breakfast)


Lactated Ringer's 1000 Ml Inj (Lr 1000 M (18 09:56)


Lactated Ringer's 1000 Ml Inj (Lr 1000 M (18 10:26)


Cefazolin 2 Gm Premix (Ancef 2 Gm Premix (18 11:00)


Citric Acid-Sodium Citrate Liq (Bicitra (18 11:30)


Type And Screen (18 09:56)


Complete Blood Count With Diff (18 09:56)


Urinalysis - C+S If Indicated (18 09:56)


Drug Screen, Random Urine (18 09:56)


Inpatient Certification (18 )


Specimen To Be Collected PRN (18 09:56)


Specimen To Be Collected PRN (18 09:56)


Group B Strep:  Negative





Assessment/Plan


Assessment and Plan


27 year old  at 39-4/7 weeks gestation.





1. IUP- Category I tracing, reassuring.


2. Repeat  and bilateral tubal ligation.


3. History of Eclampsia. 


4. GBS negative.





dw Paulette Hawthorne MD R3 May 7, 2018 10:06

## 2018-05-07 NOTE — PD.OP
__________________________________________________





Operative Report


Date of Surgery:  May 7, 2018


Preoperative Diagnosis:  


Postoperative Diagnosis:  


Procedure:


Repeat low transverse  section and bilateral tubal ligation


Anesthesia:


Spinal


Surgeon:


KALYAN Romeo MD


Assistant(s):


Gloria Orozco


Resident Surgeon:


Paulette Menchaca MD


Operation and Findings:


PREOPERATIVE DIAGNOSIS:


1. Intrauterine pregnancy at 39-4/7 weeeks gestation.


2. Declined a .


3. Desires permanent sterilization.


 


POSTOPERATIVE DIAGNOSIS:


1. Intrauterine pregnancy at 39-4/7 weeeks gestation.


2. Declined a .


3. Desires permanent sterilization.





OPERATION:


1. Repeat low transverse  section.


2. Bilateral tubal ligation.


 


ANESTHESIA:


Spinal.


 


SURGEON:


KALYAN Romeo MD.


 


COSURGEON:


Paulette Menchaca MD R3.


 


FINDINGS:


A normal male infant with APGARS of 9 and 9, birth weight 3720g. The uterus was 

normal. The tubes were normal in length and caliber, the ovaries were normal. 

The cul-de-sac was normal.


 


COMPLICATIONS:


None.


 


COUNTS:


The counts were correct.


 


ESTIMATED BLOOD LOSS:


600 cc.


 


FLUIDS:


Crystalloids.


 


FINDINGS:


The patient tolerated the procedure well, went to the Recovery Room in good 

condition.





PROCEDURE:


She was taken to the operating room, identified by name band and verbally given 

a spinal anesthetic and under adequate level she was prepped and draped in the 

usual sterile manner for a  section.


 


A time out was taken and once agreed upon a Pfannenstiel incision was made and 

the old scar removed. The incision was taken down to the fascia, the fascia was 

taken off the rectus muscle by blunt and sharp dissection. The rectus muscles 

were spread bluntly and the peritoneum was entered under direct vision without 

difficulty. The incision was extended with care to avoid the urinary bladder. 

The bladder blade was placed and the bladder flap was created in the usual 

fashion.


 


A transverse incision along the lower uterine segment was made and bluntly 

extended. 


 


The fetal head was delivered with gentle fundal pressure without difficulty and 

then the rest of the body was delivered without difficulty. Cord clamping was 

delayed for 45 seconds and then the baby was handed to the baby nurse. Cord 

blood was obtained and the placental was delivered manually.


 


The uterus was curettage twice with the wet lap. The uterine incision was then 

repaired with the 0 Vicryl in a running fashion, the second layer imbricating 

the first with excellent results.  Hemostasis was achieved with several figure 

of eight sutures and hemostasis was excellent.


 


Attention was then turned to the tubal ligation. The right fallopian tube was 

gasped with a [place] and a small incision was made in the mesosalpinx and a 

portion of the tube was tied off with zero chromic catgut and a small piece of 

tube was removed and sent for pathologic evaluation. This was repeated on the 

contralateral side. The cul-de-sac was cleaned of blood and debris and the 

uterus was delivered back into the abdomen. All incisions and pedicles were 

then inspected and were hemostatic. The rectus muscle was reapproximated with 0 

Vicryl in a running fashion.


 


The fascia was repaired with 0 Vicryl in a running fashion. The subcutaneous 

was repaired in two layers with 2-0 Vicryl and the skin was repaired with 4-0 

Monocryl in a subcuticular manner. The patient tolerated the procedure well and 

went to the recovery room in good condition. All counts were correct.











Paulette Menchaca MD R3 May 7, 2018 15:14

## 2018-05-08 VITALS
SYSTOLIC BLOOD PRESSURE: 116 MMHG | TEMPERATURE: 98.2 F | DIASTOLIC BLOOD PRESSURE: 65 MMHG | RESPIRATION RATE: 18 BRPM | HEART RATE: 61 BPM

## 2018-05-08 VITALS
SYSTOLIC BLOOD PRESSURE: 111 MMHG | DIASTOLIC BLOOD PRESSURE: 66 MMHG | HEART RATE: 53 BPM | RESPIRATION RATE: 18 BRPM | OXYGEN SATURATION: 100 % | TEMPERATURE: 97.9 F

## 2018-05-08 VITALS
TEMPERATURE: 98 F | SYSTOLIC BLOOD PRESSURE: 112 MMHG | HEART RATE: 67 BPM | DIASTOLIC BLOOD PRESSURE: 58 MMHG | RESPIRATION RATE: 6 BRPM | OXYGEN SATURATION: 98 %

## 2018-05-08 VITALS
TEMPERATURE: 98.1 F | RESPIRATION RATE: 17 BRPM | DIASTOLIC BLOOD PRESSURE: 71 MMHG | HEART RATE: 58 BPM | SYSTOLIC BLOOD PRESSURE: 123 MMHG

## 2018-05-08 VITALS
TEMPERATURE: 98.1 F | SYSTOLIC BLOOD PRESSURE: 114 MMHG | HEART RATE: 58 BPM | DIASTOLIC BLOOD PRESSURE: 62 MMHG | RESPIRATION RATE: 18 BRPM

## 2018-05-08 LAB
BASOPHILS # BLD AUTO: 0 TH/MM3 (ref 0–0.2)
BASOPHILS NFR BLD: 0.2 % (ref 0–2)
EOSINOPHIL # BLD: 0 TH/MM3 (ref 0–0.4)
EOSINOPHIL NFR BLD: 0.1 % (ref 0–4)
ERYTHROCYTE [DISTWIDTH] IN BLOOD BY AUTOMATED COUNT: 13.9 % (ref 11.6–17.2)
HCT VFR BLD CALC: 28.8 % (ref 35–46)
HGB BLD-MCNC: 9.7 GM/DL (ref 11.6–15.3)
LYMPHOCYTES # BLD AUTO: 2.6 TH/MM3 (ref 1–4.8)
LYMPHOCYTES NFR BLD AUTO: 17 % (ref 9–44)
MCH RBC QN AUTO: 29.8 PG (ref 27–34)
MCHC RBC AUTO-ENTMCNC: 33.6 % (ref 32–36)
MCV RBC AUTO: 88.7 FL (ref 80–100)
MONOCYTE #: 1.2 TH/MM3 (ref 0–0.9)
MONOCYTES NFR BLD: 8.2 % (ref 0–8)
NEUTROPHILS # BLD AUTO: 11.2 TH/MM3 (ref 1.8–7.7)
NEUTROPHILS NFR BLD AUTO: 74.5 % (ref 16–70)
PLATELET # BLD: 161 TH/MM3 (ref 150–450)
PMV BLD AUTO: 10.3 FL (ref 7–11)
RBC # BLD AUTO: 3.25 MIL/MM3 (ref 4–5.3)
WBC # BLD AUTO: 15 TH/MM3 (ref 4–11)

## 2018-05-08 RX ADMIN — IBUPROFEN PRN MG: 600 TABLET, FILM COATED ORAL at 04:23

## 2018-05-08 RX ADMIN — OXYCODONE HYDROCHLORIDE AND ACETAMINOPHEN PRN TAB: 5; 325 TABLET ORAL at 21:24

## 2018-05-08 RX ADMIN — OXYCODONE HYDROCHLORIDE AND ACETAMINOPHEN PRN TAB: 5; 325 TABLET ORAL at 04:24

## 2018-05-08 RX ADMIN — IBUPROFEN PRN MG: 600 TABLET, FILM COATED ORAL at 21:23

## 2018-05-08 RX ADMIN — OXYCODONE HYDROCHLORIDE AND ACETAMINOPHEN PRN TAB: 5; 325 TABLET ORAL at 17:16

## 2018-05-08 RX ADMIN — OXYCODONE HYDROCHLORIDE AND ACETAMINOPHEN PRN TAB: 5; 325 TABLET ORAL at 08:39

## 2018-05-08 RX ADMIN — IBUPROFEN PRN MG: 600 TABLET, FILM COATED ORAL at 12:32

## 2018-05-08 RX ADMIN — STANDARDIZED SENNA CONCENTRATE AND DOCUSATE SODIUM PRN TAB: 8.6; 5 TABLET, FILM COATED ORAL at 08:40

## 2018-05-08 RX ADMIN — OXYCODONE HYDROCHLORIDE AND ACETAMINOPHEN PRN TAB: 5; 325 TABLET ORAL at 12:33

## 2018-05-08 NOTE — HHI.OB
Subjective


Post Operative Day:  1





Objective


Vitals/I&O





Vital Signs








  Date Time  Temp Pulse Resp B/P (MAP) Pulse Ox O2 Delivery O2 Flow Rate FiO2


 


18 07:46  53 18 111/66 (81)    


 


18 07:46 97.9    100   


 


18 03:57 98.1 58 17 123/71 (88)    


 


18 23:57 97.8 51 17 114/62 (79)    


 


18 20:01 97.5 56 17 121/78 (92)    


 


18 15:09  51 20 119/69 (86) 97   


 


18 14:35 97.5       


 


18 10:05 98.2  18     








Result Diagram:  


18 0508





Objective Remarks


GENERAL: Well-nourished, well-developed patient.


CARDIOVASCULAR: Regular rate and rhythm without murmurs, gallops, or rubs. 


RESPIRATORY: Breath sounds equal bilaterally. No accessory muscle use.


ABDOMEN/GI: Abdomen soft, non-tender, bowel sounds present. 


   Incision: DRESSING. Clean, dry and intact.


   Fundus: Firm, non-tender at umbilicus.


GENITOURINARY: Light to moderate bleeding.


EXTREMITIES: No cyanosis or edema, non-tender, without signs of DVT.


Medications and IVs





Current Medications








 Medications


  (Trade)  Dose


 Ordered  Sig/Andrew


 Route  Start Time


 Stop Time Status Last Admin


 


 Lactated Ringer's  1,000 ml @ 


 100 mls/hr  Q10H


 IV  18 18:06


 18 14:05  18 23:20


 


 


 Oxytocin  500 ml @ 


 100 mls/hr  UNSCH X1  PRN


 IV  18 18:15


 18 18:14  18 15:05


 


 


  (NS Flush)  2 ml  BID


 IV FLUSH  18 21:00


     


 


 


  (NS Flush)  2 ml  UNSCH  PRN


 IV FLUSH  18 13:15


     


 


 


  (Mylicon Chew)  80 mg  QID  PRN


 PO  18 13:15


     


 


 


  (Tylenol)  650 mg  Q6H  PRN


 PO  18 13:15


     


 


 


  (Motrin)  600 mg  Q6H  PRN


 PO  18 13:15


    18 04:23


 


 


  (Percocet  5-325


 Mg)  1 tab  Q4H  PRN


 PO  18 13:15


     


 


 


  (Percocet  5-325


 Mg)  2 tab  Q4H  PRN


 PO  18 13:15


    18 08:39


 


 


  (Zohra-Colace)  2 tab  Q12H  PRN


 PO  18 13:15


    18 08:40


 


 


  (M-M-R Ii Inj)  0.5 ml  ONCE ONCE


 SQ  18 16:00


 18 16:01   


 


 


  (Boostrix Inj)  0.5 ml  ONCE ONCE


 IM  18 16:00


 18 16:01   


 


 


  (Zofran Inj)  4 mg  Q6H  PRN


 IV PUSH  18 13:15


    18 18:14


 


 


  (Misc Nursing


 Information)  NO SYSTEMIC


 NARCOTICS TO BE


 GIVEN FO...  UNSCH  PRN


 .XX  18 12:20


 18 12:19   


 


 


  (Narcan Inj)  0.4 mg  UNSCH  PRN


 IV PUSH  18 12:20


 18 12:19   


 


 


  (Benadryl Inj)  25 mg  Q6H  PRN


 IV PUSH  18 12:20


 18 12:19   


 


 


  (Benadryl)  50 mg  Q6H  PRN


 PO  18 12:20


 18 12:19   


 


 


  (Misc Nursing


 Information)  ALL


 NURSING


 DEPARTMENTS  UNSCH  PRN


 .XX  18 12:20


 18 12:19   


 











Assessment/Plan


Problem List:  


(1) S/P repeat low transverse 


ICD Codes:  Z98.891 - History of uterine scar from previous surgery


(2) Anemia


ICD Codes:  D64.9 - Anemia, unspecified


(3) S/P tubal ligation


ICD Codes:  Z98.51 - Tubal ligation status


Assessment and Plan


 POD #1 C/ WITH TUBAL





PT DOING WELL


PT NOT KEEPING UP WITH TAKING PAIN MEDICATION, SHE IS ENCOURAGED TO DO SO


SHE WILL SHOWER TODAY AND REMOVE DRESSING


BREASTFEEDING AND BONDING WITH INFANT


ROUTINE CARE


Discharge Planning


CONSIDER DC TOMORROW











Philomena Gonzales May 8, 2018 08:59

## 2018-05-09 VITALS
TEMPERATURE: 98.2 F | DIASTOLIC BLOOD PRESSURE: 62 MMHG | SYSTOLIC BLOOD PRESSURE: 116 MMHG | RESPIRATION RATE: 18 BRPM | HEART RATE: 18 BPM

## 2018-05-09 VITALS — SYSTOLIC BLOOD PRESSURE: 116 MMHG | DIASTOLIC BLOOD PRESSURE: 62 MMHG | HEART RATE: 18 BPM

## 2018-05-09 VITALS
RESPIRATION RATE: 18 BRPM | TEMPERATURE: 98.2 F | DIASTOLIC BLOOD PRESSURE: 68 MMHG | SYSTOLIC BLOOD PRESSURE: 116 MMHG | HEART RATE: 62 BPM

## 2018-05-09 VITALS
RESPIRATION RATE: 18 BRPM | DIASTOLIC BLOOD PRESSURE: 73 MMHG | TEMPERATURE: 97.8 F | SYSTOLIC BLOOD PRESSURE: 131 MMHG | HEART RATE: 70 BPM

## 2018-05-09 RX ADMIN — STANDARDIZED SENNA CONCENTRATE AND DOCUSATE SODIUM PRN TAB: 8.6; 5 TABLET, FILM COATED ORAL at 06:01

## 2018-05-09 RX ADMIN — OXYCODONE HYDROCHLORIDE AND ACETAMINOPHEN PRN TAB: 5; 325 TABLET ORAL at 01:41

## 2018-05-09 RX ADMIN — IBUPROFEN PRN MG: 400 TABLET ORAL at 11:51

## 2018-05-09 RX ADMIN — OXYCODONE HYDROCHLORIDE AND ACETAMINOPHEN PRN TAB: 5; 325 TABLET ORAL at 06:01

## 2018-05-09 RX ADMIN — IBUPROFEN PRN MG: 600 TABLET, FILM COATED ORAL at 06:01

## 2018-05-09 RX ADMIN — OXYCODONE HYDROCHLORIDE AND ACETAMINOPHEN PRN TAB: 5; 325 TABLET ORAL at 16:16

## 2018-05-09 RX ADMIN — STANDARDIZED SENNA CONCENTRATE AND DOCUSATE SODIUM PRN TAB: 8.6; 5 TABLET, FILM COATED ORAL at 20:22

## 2018-05-09 RX ADMIN — OXYCODONE HYDROCHLORIDE AND ACETAMINOPHEN PRN TAB: 5; 325 TABLET ORAL at 20:22

## 2018-05-09 RX ADMIN — IBUPROFEN PRN MG: 400 TABLET ORAL at 20:23

## 2018-05-09 RX ADMIN — IBUPROFEN PRN MG: 400 TABLET ORAL at 16:16

## 2018-05-09 RX ADMIN — OXYCODONE HYDROCHLORIDE AND ACETAMINOPHEN PRN TAB: 5; 325 TABLET ORAL at 11:51

## 2018-05-09 NOTE — HHI.OB
Subjective


Post Operative Day:  2





Objective


Vitals/I&O





Vital Signs








  Date Time  Temp Pulse Resp B/P (MAP) Pulse Ox O2 Delivery O2 Flow Rate FiO2


 


18 20:00  67 6 112/58 (76) 98   


 


18 20:00 98.0       


 


18 16:40  58  114/62 (79)    


 


18 16:40 98.1  18     


 


18 12:03 98.2 61 18 116/65 (82)    








Result Diagram:  


18 0508





Objective Remarks


GENERAL: Well-nourished, well-developed patient.


CARDIOVASCULAR: Regular rate and rhythm without murmurs, gallops, or rubs. 


RESPIRATORY: Breath sounds equal bilaterally. No accessory muscle use.


ABDOMEN/GI: Abdomen soft, non-tender, bowel sounds present. 


   Incision:Clean, dry and intact.


   Fundus: Firm, non-tender at umbilicus.


GENITOURINARY: Light to moderate bleeding.


EXTREMITIES: No cyanosis or edema, non-tender, without signs of DVT.


Medications and IVs





Current Medications








 Medications


  (Trade)  Dose


 Ordered  Sig/Andrew


 Route  Start Time


 Stop Time Status Last Admin


 


  (NS Flush)  2 ml  BID


 IV FLUSH  18 21:00


     


 


 


  (NS Flush)  2 ml  UNSCH  PRN


 IV FLUSH  18 13:15


     


 


 


  (Mylicon Chew)  80 mg  QID  PRN


 PO  18 13:15


     


 


 


  (Tylenol)  650 mg  Q6H  PRN


 PO  18 13:15


     


 


 


  (Motrin)  600 mg  Q6H  PRN


 PO  18 13:15


    18 06:01


 


 


  (Percocet  5-325


 Mg)  1 tab  Q4H  PRN


 PO  18 13:15


     


 


 


  (Percocet  5-325


 Mg)  2 tab  Q4H  PRN


 PO  18 13:15


    18 06:01


 


 


  (Zohra-Colace)  2 tab  Q12H  PRN


 PO  18 13:15


    18 06:01


 


 


  (Zofran Inj)  4 mg  Q6H  PRN


 IV PUSH  18 13:15


    18 18:14


 











Assessment/Plan


Problem List:  


(1) S/P repeat low transverse 


ICD Codes:  Z98.891 - History of uterine scar from previous surgery


(2) Anemia


ICD Codes:  D64.9 - Anemia, unspecified


(3) S/P tubal ligation


ICD Codes:  Z98.51 - Tubal ligation status


Assessment and Plan


 POD #2 C/S  WITH TUBAL





PT DOING WELL


PT WANTING MOTRIN AND PAIN MEDICINE TOGETHER SHE FEELS THE WAY IT WAS 

PRESCRIBED WASN'T GETTING HER THROUGH, WE WILL CHANGE ORDER


BREASTFEEDING AND BONDING WITH INFANT


ROUTINE CARE


Discharge Planning


CONSIDER DC HOME TODAY OR TOMORROW











Philomena Gonzales May 9, 2018 08:44

## 2018-05-10 VITALS
DIASTOLIC BLOOD PRESSURE: 61 MMHG | SYSTOLIC BLOOD PRESSURE: 104 MMHG | TEMPERATURE: 97.8 F | RESPIRATION RATE: 20 BRPM | HEART RATE: 64 BPM

## 2018-05-10 RX ADMIN — OXYCODONE HYDROCHLORIDE AND ACETAMINOPHEN PRN TAB: 5; 325 TABLET ORAL at 00:19

## 2018-05-10 RX ADMIN — IBUPROFEN PRN MG: 400 TABLET ORAL at 04:34

## 2018-05-10 RX ADMIN — IBUPROFEN PRN MG: 400 TABLET ORAL at 09:13

## 2018-05-10 RX ADMIN — OXYCODONE HYDROCHLORIDE AND ACETAMINOPHEN PRN TAB: 5; 325 TABLET ORAL at 04:34

## 2018-05-10 RX ADMIN — IBUPROFEN PRN MG: 400 TABLET ORAL at 00:19

## 2018-05-10 RX ADMIN — OXYCODONE HYDROCHLORIDE AND ACETAMINOPHEN PRN TAB: 5; 325 TABLET ORAL at 09:12

## 2018-05-10 NOTE — HHI.OB
Subjective


Post Operative Day:  3





Objective


Vitals/I&O





Vital Signs








  Date Time  Temp Pulse Resp B/P (MAP) Pulse Ox O2 Delivery O2 Flow Rate FiO2


 


5/10/18 07:30 97.8 64 20 104/61 (75)    


 


18 19:45 97.8 70 18 131/73 (92)    


 


18 09:20  18  116/62 (80)    


 


18 09:20 98.2  18     


 


18 09:00  18  116/62 (80)    








Result Diagram:  


18 0508





Objective Remarks


GENERAL: Well-nourished, well-developed patient.


CARDIOVASCULAR: Regular rate and rhythm without murmurs, gallops, or rubs. 


RESPIRATORY: Breath sounds equal bilaterally. No accessory muscle use.


ABDOMEN/GI: Abdomen soft, non-tender, bowel sounds present. 


   Incision:Clean, dry and intact.


   Fundus: Firm, non-tender at umbilicus.


GENITOURINARY: Light to moderate bleeding.


EXTREMITIES: No cyanosis or edema, non-tender, without signs of DVT.


Medications and IVs





Current Medications








 Medications


  (Trade)  Dose


 Ordered  Sig/Andrew


 Route  Start Time


 Stop Time Status Last Admin


 


  (NS Flush)  2 ml  BID


 IV FLUSH  18 21:00


     


 


 


  (NS Flush)  2 ml  UNSCH  PRN


 IV FLUSH  18 13:15


     


 


 


  (Mylicon Chew)  80 mg  QID  PRN


 PO  18 13:15


     


 


 


  (Tylenol)  650 mg  Q6H  PRN


 PO  18 13:15


     


 


 


  (Percocet  5-325


 Mg)  1 tab  Q4H  PRN


 PO  18 13:15


     


 


 


  (Percocet  5-325


 Mg)  2 tab  Q4H  PRN


 PO  18 13:15


    5/10/18 04:34


 


 


  (Zohra-Colace)  2 tab  Q12H  PRN


 PO  18 13:15


    18 20:22


 


 


  (Zofran Inj)  4 mg  Q6H  PRN


 IV PUSH  18 13:15


    18 18:14


 


 


  (Motrin)  400 mg  Q4HR  PRN


 PO  18 09:15


    5/10/18 04:34


 











Assessment/Plan


Problem List:  


(1) S/P repeat low transverse 


ICD Codes:  Z98.891 - History of uterine scar from previous surgery


(2) Anemia


ICD Codes:  D64.9 - Anemia, unspecified


(3) S/P tubal ligation


ICD Codes:  Z98.51 - Tubal ligation status


Assessment and Plan


 POD #3 C/S  WITH TUBAL





PT DOING WELL


PAIN MANAGED WELL WITH ORAL PAIN MEDICATION


BREASTFEEDING AND BONDING WITH INFANT


ROUTINE CARE


Discharge Planning


 DC HOME TODAY











Philomena Gonzales May 10, 2018 09:01

## 2018-05-10 NOTE — HHI.DCPOC
Discharge Care Plan


Diagnosis:  


(1) S/P repeat low transverse 


(2) S/P tubal ligation








Your Health Problems Are:  delivery








Report Symptoms to Your Doctor


-Temperature above 100.5 degrees


-Redness, of incision or excessive or foul smelling drainage


-Unusual pain or calf pain


-Increased vaginal bleeding


-Painful or difficulty urinating


-Feelings of extreme sadness or anxiety after 2 weeks


Goals to Promote Your Health


* To prevent worsening of your condition and complications


* To maintain your health at the optimal level


Directions to Meet Your Goals


*** Take your medications as prescribed


*** Follow your dietary instruction


*** Follow activity as directed


*** Ensure plenty of rest for recovery


*** Drink fluids for hydration








*** Keep your appointments as scheduled


*** Take your immunizations and boosters as scheduled


*** If your symptoms worsen call your PCP, if no PCP go to Urgent Care Center 

or Emergency Room***


*** Smoking is Dangerous to Your Health. Avoid second hand smoke***


***Call the 24-hour crisis hotline for domestic abuse at 1-208.511.3366***











Philomena Gonzales May 10, 2018 09:03

## 2018-05-10 NOTE — HHI.DS
Admission Date


May 7, 2018 at 09:46


Discharge Date:  May 10, 2018


Admitting Diagnosis


TERM PREGNANCY


PREVIOUS C SECTION


DESIRES STERILIZATION


Diagnosis:  


(1) S/P tubal ligation


ICD Codes:  Z98.51 - Tubal ligation status


(2) S/P repeat low transverse 


ICD Codes:  Z98.891 - History of uterine scar from previous surgery


Delivery Date:  May 7, 2018


:  Repeat


Infant:  Male


Brief History


Patient is a 27 year old  at 39-4/7 weeks gestation who presents today for 

repeat  and bilateral tubal ligation.  She denies any vaginal bleeding 

or discharge. No gush or leaking of fluid. Positive fetal movement. Occasional 

contractions. No complications with this pregnancy.


Hospital Course


 REPEAT C SECTION AND TUBAL LIGATION


ROUTINE CARE


Pt Condition on Discharge:  Good


Discharge Disposition:  Discharge Home


Discharge Instructions


Diet Instructions:  As Tolerated, No Restrictions


Additional Diet Instructions:  


Drink at least 8 - 16 oz bottles of water a day


Activities You Can Perform:  Shower Only-No Bath


Activities to Avoid:  Prolonged Standing, Strenuous Activity, Sexual Activity


Additional Activity Instruc.:  


No driving until off pain medications





Do not lift anything heavier than your baby in an infant carrier


Follow up Referrals:  


OB/GYN - 1 Week @ [facility]





New Medications:  


Ibuprofen (Ibuprofen) 400 Mg Tab


400 MG PO Q4HR PRN for POSTPARTUM CRAMPING, #30 TAB





Oxycodone HCl/Acetaminophen (Oxycodone-Acetaminophen 5-325) 5 Mg-325 Mg Tablet


1-2 TAB PO Q4H PRN for moderate pain , #30 TAB





 


Continued Medications:  


Albuterol 6.7 GM Inh (Proventil Hfa 6.7 GM Inh) 90 Mcg/Act Aer


2 PUFF INH Q6H PRN for SHORTNESS OF BREATH, #1 INHALER 0 Refills





Albuterol Neb (Albuterol Neb) 2.5 Mg/0.5 Ml Neb


2.5 MG NEB Q4HR NEB PRN for SHORTNESS OF BREATH, EA


Note: The Albuterol Sulfate Inhalation Solution is concentrated and


 must be diluted. Read complete instructions carefully before using.


Pantoprazole (Protonix) 40 Mg Tab


40 MG PO DAILY for Reflux for 30 Days, #30 TAB 1 Refill





Prenatal Vit-Iron Carbonyl (Prenatal Plus Iron 29-1 mg) 29 Mg Iron-1 Mg Tab


1 TAB PO DAILY for Nutritional Supplement, #30 TAB 0 Refills





 


Discontinued Medications:  


Aspirin (Aspirin Children's) 81 Mg Chew


81 MG CHEW DAILY, TAB 0 Refills

















Philomena Gonzaels May 10, 2018 09:06